# Patient Record
Sex: MALE | Race: WHITE | Employment: OTHER | URBAN - METROPOLITAN AREA
[De-identification: names, ages, dates, MRNs, and addresses within clinical notes are randomized per-mention and may not be internally consistent; named-entity substitution may affect disease eponyms.]

---

## 2017-01-17 ENCOUNTER — APPOINTMENT (OUTPATIENT)
Dept: GENERAL RADIOLOGY | Facility: HOSPITAL | Age: 67
DRG: 190 | End: 2017-01-17
Payer: MEDICARE

## 2017-01-17 ENCOUNTER — HOSPITAL ENCOUNTER (INPATIENT)
Facility: HOSPITAL | Age: 67
LOS: 9 days | Discharge: HOME | DRG: 190 | End: 2017-01-26
Attending: EMERGENCY MEDICINE | Admitting: HOSPITALIST
Payer: MEDICARE

## 2017-01-17 ENCOUNTER — APPOINTMENT (OUTPATIENT)
Dept: GENERAL RADIOLOGY | Facility: HOSPITAL | Age: 67
DRG: 190 | End: 2017-01-17
Attending: EMERGENCY MEDICINE
Payer: MEDICARE

## 2017-01-17 ENCOUNTER — PRIOR ORIGINAL RECORDS (OUTPATIENT)
Dept: OTHER | Age: 67
End: 2017-01-17

## 2017-01-17 DIAGNOSIS — J44.1 COPD EXACERBATION (HCC): ICD-10-CM

## 2017-01-17 DIAGNOSIS — I48.91 ATRIAL FIBRILLATION WITH RAPID VENTRICULAR RESPONSE (HCC): Primary | ICD-10-CM

## 2017-01-17 LAB
BASOPHILS # BLD AUTO: 0.07 X10(3) UL (ref 0–0.1)
BASOPHILS NFR BLD AUTO: 0.8 %
BETA NATRIURETIC PEPTIDE: 93 PG/ML (ref 2–99)
BUN BLD-MCNC: 21 MG/DL (ref 8–20)
CALCIUM BLD-MCNC: 9.4 MG/DL (ref 8.3–10.3)
CHLORIDE: 103 MMOL/L (ref 101–111)
CO2: 27 MMOL/L (ref 22–32)
CREAT BLD-MCNC: 1.22 MG/DL (ref 0.7–1.3)
EOSINOPHIL # BLD AUTO: 0.5 X10(3) UL (ref 0–0.3)
EOSINOPHIL NFR BLD AUTO: 5.6 %
ERYTHROCYTE [DISTWIDTH] IN BLOOD BY AUTOMATED COUNT: 12.5 % (ref 11.5–16)
FIO2: 21 %
GLUCOSE BLD-MCNC: 112 MG/DL (ref 70–99)
HAV IGM SER QL: 2.1 MG/DL (ref 1.7–3)
HCT VFR BLD AUTO: 49.8 % (ref 37–53)
HGB BLD-MCNC: 16.5 G/DL (ref 13–17)
IMMATURE GRANULOCYTE COUNT: 0.04 X10(3) UL (ref 0–1)
IMMATURE GRANULOCYTE RATIO %: 0.4 %
LYMPHOCYTES # BLD AUTO: 1.58 X10(3) UL (ref 0.9–4)
LYMPHOCYTES NFR BLD AUTO: 17.7 %
MCH RBC QN AUTO: 33.5 PG (ref 27–33.2)
MCHC RBC AUTO-ENTMCNC: 33.1 G/DL (ref 31–37)
MCV RBC AUTO: 101.2 FL (ref 80–99)
MONOCYTES # BLD AUTO: 0.54 X10(3) UL (ref 0.1–0.6)
MONOCYTES NFR BLD AUTO: 6 %
NEUTROPHIL ABS PRELIM: 6.2 X10 (3) UL (ref 1.3–6.7)
NEUTROPHILS # BLD AUTO: 6.2 X10(3) UL (ref 1.3–6.7)
NEUTROPHILS NFR BLD AUTO: 69.5 %
PLATELET # BLD AUTO: 159 10(3)UL (ref 150–450)
POTASSIUM SERPL-SCNC: 4.7 MMOL/L (ref 3.6–5.1)
RBC # BLD AUTO: 4.92 X10(6)UL (ref 3.8–5.8)
RED CELL DISTRIBUTION WIDTH-SD: 46.9 FL (ref 35.1–46.3)
SODIUM SERPL-SCNC: 139 MMOL/L (ref 136–144)
TROPONIN: <0.046 NG/ML (ref ?–0.05)
VENOUS BASE EXCESS: -0.3
VENOUS BLOOD GAS HCO3: 27.1 MEQ/L (ref 23–27)
VENOUS O2 SAT CALC: 38 % (ref 72–78)
VENOUS O2 SATURATION: 45 % (ref 72–78)
VENOUS PCO2: 53 MM HG (ref 38–50)
VENOUS PH: 7.32 (ref 7.33–7.43)
VENOUS PO2: 24 MM HG (ref 30–50)
WBC # BLD AUTO: 8.9 X10(3) UL (ref 4–13)

## 2017-01-17 PROCEDURE — 71010 XR CHEST AP PORTABLE  (CPT=71010): CPT

## 2017-01-17 PROCEDURE — 99223 1ST HOSP IP/OBS HIGH 75: CPT | Performed by: HOSPITALIST

## 2017-01-17 RX ORDER — ONDANSETRON 2 MG/ML
4 INJECTION INTRAMUSCULAR; INTRAVENOUS EVERY 6 HOURS PRN
Status: DISCONTINUED | OUTPATIENT
Start: 2017-01-17 | End: 2017-01-26

## 2017-01-17 RX ORDER — IPRATROPIUM BROMIDE AND ALBUTEROL SULFATE 2.5; .5 MG/3ML; MG/3ML
3 SOLUTION RESPIRATORY (INHALATION)
Status: DISCONTINUED | OUTPATIENT
Start: 2017-01-18 | End: 2017-01-20

## 2017-01-17 RX ORDER — IPRATROPIUM BROMIDE AND ALBUTEROL SULFATE 2.5; .5 MG/3ML; MG/3ML
3 SOLUTION RESPIRATORY (INHALATION) ONCE
Status: COMPLETED | OUTPATIENT
Start: 2017-01-17 | End: 2017-01-17

## 2017-01-17 RX ORDER — ACETAMINOPHEN 325 MG/1
650 TABLET ORAL EVERY 6 HOURS PRN
Status: DISCONTINUED | OUTPATIENT
Start: 2017-01-17 | End: 2017-01-26

## 2017-01-17 RX ORDER — METHYLPREDNISOLONE SODIUM SUCCINATE 125 MG/2ML
60 INJECTION, POWDER, LYOPHILIZED, FOR SOLUTION INTRAMUSCULAR; INTRAVENOUS EVERY 8 HOURS
Status: DISCONTINUED | OUTPATIENT
Start: 2017-01-18 | End: 2017-01-19

## 2017-01-17 RX ORDER — ALBUTEROL SULFATE 90 UG/1
2 AEROSOL, METERED RESPIRATORY (INHALATION) EVERY 6 HOURS PRN
COMMUNITY
End: 2017-03-07

## 2017-01-17 RX ORDER — METHYLPREDNISOLONE SODIUM SUCCINATE 125 MG/2ML
125 INJECTION, POWDER, LYOPHILIZED, FOR SOLUTION INTRAMUSCULAR; INTRAVENOUS ONCE
Status: COMPLETED | OUTPATIENT
Start: 2017-01-17 | End: 2017-01-17

## 2017-01-17 RX ORDER — DILTIAZEM HYDROCHLORIDE 5 MG/ML
10 INJECTION INTRAVENOUS
Status: DISPENSED | OUTPATIENT
Start: 2017-01-17 | End: 2017-01-17

## 2017-01-17 NOTE — ED INITIAL ASSESSMENT (HPI)
Pt states productive cough since tamiko. Pt states cough has improved but SOB is getting worse. Pt SOB with activity and with speaking.   Pt currently on 2nd round of abx (pt denies knowledge of name.) pt with pursed lip breathing

## 2017-01-18 ENCOUNTER — APPOINTMENT (OUTPATIENT)
Dept: CV DIAGNOSTICS | Facility: HOSPITAL | Age: 67
DRG: 190 | End: 2017-01-18
Attending: INTERNAL MEDICINE
Payer: MEDICARE

## 2017-01-18 LAB
ATRIAL RATE: 117 BPM
Q-T INTERVAL: 358 MS
QRS DURATION: 120 MS
QTC CALCULATION (BEZET): 499 MS
R AXIS: 103 DEGREES
T AXIS: -1 DEGREES
VENTRICULAR RATE: 117 BPM

## 2017-01-18 PROCEDURE — 99232 SBSQ HOSP IP/OBS MODERATE 35: CPT | Performed by: HOSPITALIST

## 2017-01-18 PROCEDURE — 93306 TTE W/DOPPLER COMPLETE: CPT | Performed by: INTERNAL MEDICINE

## 2017-01-18 PROCEDURE — 93306 TTE W/DOPPLER COMPLETE: CPT

## 2017-01-18 RX ORDER — DILTIAZEM HYDROCHLORIDE 180 MG/1
180 CAPSULE, EXTENDED RELEASE ORAL DAILY
Status: DISCONTINUED | OUTPATIENT
Start: 2017-01-18 | End: 2017-01-19

## 2017-01-18 NOTE — PLAN OF CARE
Patient is alert and oriented, denies any complaint of pain. Vitals signs stable, afib per tele. Switched to oral Cardizem. Lung diminished, nonproductive cough, scheduled nebs, robitussin added. Flu panel negative. Will continue to monitor.      Patient/Fa

## 2017-01-18 NOTE — PROGRESS NOTES
[facility]  Progress Note     Chrissy Cantu Patient Status:  Inpatient    3/18/1950 MRN [id number]   [facility] 7NE-A Attending Sylvia Lamas MD   Hosp Day # 1 PCP Gabriel De La Garza DO     Chief Complaint: sob and wheezing    S: Patie above    Quality:  · DVT Prophylaxis: Xarelto  · CODE status: full  · Ramsey: no  · Central line: no    Estimated date of discharge: tbd  Discharge is dependent on: clinical status  At this point Mr. Hunter Ayala is expected to be discharge to: home    Plan of ca

## 2017-01-18 NOTE — ED PROVIDER NOTES
Patient Seen in: [facility] 7ne-a    History   Patient presents with:  Dyspnea DHAVAL SOB (respiratory)    Stated Complaint: dhaval x one month    HPI    Patient is a 53-year-old male comes into emergency room for evaluation of cough and shortness of magan from today and agreed except as otherwise stated in HPI.     Physical Exam     ED Triage Vitals   BP 01/17/17 1635 162/100 mmHg   Pulse 01/17/17 1635 111   Resp 01/17/17 1635 24   Temp 01/17/17 1635 98 °F (36.7 °C)   Temp src 01/17/17 1635 Temporal   SpO2 0 46.9 (*)     Eosinophil Absolute 0.50 (*)     All other components within normal limits   TROPONIN I - Normal   BNP (B TYPE NATRIUERTIC PEPTIDE) - Normal   MAGNESIUM - Normal   INFLUENZA A+B, RT PCR W/RFLX TO INFLUENZA H1N1 - Normal   CBC WITH DIFFERENTIAL prolonged expiratory phase with slight wheezing. Patient given IV Solu-Medrol. Cardizem bolus given along with Cardizem drip. Case discussed with cardiology, Dr. Laz Wooten. Also discussed case with hospitalist, Dr. Franchesca Armendariz.     MDM   78year old male with c

## 2017-01-18 NOTE — PLAN OF CARE
Pt admitted from ER c/o SOB and cough, afib. Pt a/ox4. Vss, afib per tele, cardizem qtt infusing at 10ml/hr. O2 @ 2l/nc in use, nebs q4h per order. Denies pain. Droplet and contact isolation to r/o flu, pending respiratory panel. Call light in reach.  Will

## 2017-01-18 NOTE — H&P
[facility]  History and Physical     Kin Charlee Patient Status:  Inpatient    3/18/1950 MRN [id number]   [facility] 7NE-A Attending Belén Morgan MD   Hosp Day # 0 PCP Minh Chen DO     Chief Complaint: Dyspnea    Histor (81.647 kg)  BMI 25.83 kg/m2  SpO2 98%  General: No acute distress. Alert and oriented x 3. HEENT: Normocephalic atraumatic. Moist mucous membranes. EOM-I. PERRLA. Anicteric. Neck: No lymphadenopathy. No JVD. No carotid bruits.   Respiratory: B/L wheezing Patient will require inpatient services that will reasonably be expected to span two midnight's based on the clinical documentation in H+P.      Based on patients current state of illness, I anticipate that, after discharge, patient will require TBD

## 2017-01-18 NOTE — CONSULTS
[facility]  Report of Consultation    Preston Tripp Patient Status:  Inpatient    3/18/1950 MRN [id number]   [facility] 7NE-A Attending Noa Savage MD   Hosp Day # 1 PCP Loyd Claire DO     Reason Nebulization, 6 times per day  •  MethylPREDNISolone Sodium Succ (Solu-MEDROL) injection 60 mg, 60 mg, Intravenous, Q8H  •  ondansetron HCl (ZOFRAN) injection 4 mg, 4 mg, Intravenous, Q6H PRN    Review of Systems:  All systems were reviewed and are negativ

## 2017-01-18 NOTE — CONSULTS
[address] Patient Status:  Inpatient    3/18/1950 MRN [id number]   [facility] 7NE-A Attending Melanie Gaytan MD   [facility] Day # 1 PCP Laurie Iverson DO     Date of Admission: 2017  Admission Diagnosis: COPD exacerba (CARDIZEM) IVPB add-vantage, 5 mg/hr, Intravenous, Continuous  •  albuterol Sulfate (VENTOLIN) (5 MG/ML) 0.5% nebulizer solution 10 mg, 10 mg, Nebulization, Continuous  •  Rivaroxaban (XARELTO) tab 20 mg, 20 mg, Oral, Daily with food  •  acetaminophen (TYL regular rate and rhythm  Abdomen: soft, non-tender; bowel sounds normal; no masses,  no organomegaly  Extremities: extremities normal, atraumatic, no cyanosis or edema       Lab Results  Component Value Date   WBC 8.9 01/17/2017   RBC 4.92 01/17/2017   HGB

## 2017-01-18 NOTE — PROGRESS NOTES
Patient remains in atrial fibrillation but HRs are controlled. No history of afib in the past.  He is on xarelto 20mg daily due to of PE. Plan:  1. Wean off cardizem gtt  2. Defer DCCV for now given still battling COPD exacerbation  3.   Continue xare

## 2017-01-19 ENCOUNTER — PRIOR ORIGINAL RECORDS (OUTPATIENT)
Dept: OTHER | Age: 67
End: 2017-01-19

## 2017-01-19 LAB
FREE T4: 1 NG/DL (ref 0.9–1.8)
TSI SER-ACNC: 0.34 MIU/ML (ref 0.35–5.5)

## 2017-01-19 PROCEDURE — 99232 SBSQ HOSP IP/OBS MODERATE 35: CPT | Performed by: HOSPITALIST

## 2017-01-19 RX ORDER — DILTIAZEM HYDROCHLORIDE 60 MG/1
60 TABLET, FILM COATED ORAL ONCE
Status: COMPLETED | OUTPATIENT
Start: 2017-01-19 | End: 2017-01-19

## 2017-01-19 RX ORDER — PREDNISONE 20 MG/1
40 TABLET ORAL
Status: DISCONTINUED | OUTPATIENT
Start: 2017-01-20 | End: 2017-01-24

## 2017-01-19 RX ORDER — DILTIAZEM HYDROCHLORIDE 240 MG/1
240 CAPSULE, COATED, EXTENDED RELEASE ORAL DAILY
Status: DISCONTINUED | OUTPATIENT
Start: 2017-01-20 | End: 2017-01-26

## 2017-01-19 RX ORDER — CALCIUM CARBONATE 200(500)MG
500 TABLET,CHEWABLE ORAL 2 TIMES DAILY PRN
Status: DISCONTINUED | OUTPATIENT
Start: 2017-01-19 | End: 2017-01-26

## 2017-01-19 NOTE — PROGRESS NOTES
[facility]  Progress Note     Chrissy Cantu Patient Status:  Inpatient    3/18/1950 MRN [id number]   [facility] 7NE-A Attending Sylvia Lamas MD   Hosp Day # 2 PCP Gabriel De La Garza DO     Chief Complaint: sob and wheezing    S: Patie better on cardizem  3.  [facility]    Plan of care: continue ctu monitoring    Quality:  · DVT Prophylaxis: ac  · CODE status: full  · Ramsey: no  · Central line: no    Estimated date of discharge: tbd  Discharge is dependent on: clinical status  At this point

## 2017-01-19 NOTE — PLAN OF CARE
Pt alert/oriented, continues to have frequent dry coughing. A fib on monitor, CVR, Cardizem dose increased. Lung sounds with WE, steroids changed to oral with next dose. Plan: continue to monitor heart rate and rhythm and resp status.     1400: Discussed

## 2017-01-19 NOTE — PLAN OF CARE
AOx4. Denies any pain or discomfort. Pt is on O2 2L; O2 sat on upper 90's. Non-productive cough noted. Pt gets scheduled nebs. Up ad kaylynn. Plan of care discussed with pt. Call light within reach.     Patient/Family Goals    • Patient/Family Long Term Go

## 2017-01-19 NOTE — PROGRESS NOTES
Pulmonary Progress Note     Assessment / Plan:  1. COPD with acute exacerbation: no signs of infiltrate or pneumonia and agree with holding abx.  RVP is negative  -IV steroids to PO today  -cont albuterol nebs  -cont LABA / ICS  -will need o/p PFTs  -antitu

## 2017-01-19 NOTE — PROGRESS NOTES
[facility]  Progress Note    Will Cardoso Patient Status:  Inpatient    3/18/1950 MRN [id number]   [facility] 7NE-A Attending Kleber Moody MD   Hosp Day # 2 PCP Page DO Aileen     Assessment:  1.  afib with RVR - new onset.   Echo MethylPREDNISolone Sodium Succ  60 mg Intravenous Q8H     • albuterol Sulfate         Taylor Mcgraw MD  1/19/2017  9:38 AM

## 2017-01-19 NOTE — CM/SW NOTE
Pt on COPD protocol.        01/19/17 1100   CM/SW Screening   Referral Source Physician   Information Source Nursing rounds   Patient's Mental Status Alert;Oriented   Patient's [address]   Patient Status Prior to Admission   Independent with AD

## 2017-01-20 ENCOUNTER — APPOINTMENT (OUTPATIENT)
Dept: ULTRASOUND IMAGING | Facility: HOSPITAL | Age: 67
DRG: 190 | End: 2017-01-20
Attending: INTERNAL MEDICINE
Payer: MEDICARE

## 2017-01-20 ENCOUNTER — APPOINTMENT (OUTPATIENT)
Dept: GENERAL RADIOLOGY | Facility: HOSPITAL | Age: 67
DRG: 190 | End: 2017-01-20
Attending: INTERNAL MEDICINE
Payer: MEDICARE

## 2017-01-20 ENCOUNTER — APPOINTMENT (OUTPATIENT)
Dept: CT IMAGING | Facility: HOSPITAL | Age: 67
DRG: 190 | End: 2017-01-20
Attending: INTERNAL MEDICINE
Payer: MEDICARE

## 2017-01-20 LAB
ALLENS TEST: POSITIVE
ARTERIAL BLD GAS O2 SATURATION: 90 % (ref 92–100)
ARTERIAL BLOOD GAS BASE EXCESS: 2.1
ARTERIAL BLOOD GAS HCO3: 25.8 MEQ/L (ref 22–26)
ARTERIAL BLOOD GAS PCO2: 37 MM HG (ref 35–45)
ARTERIAL BLOOD GAS PH: 7.46 (ref 7.35–7.45)
ARTERIAL BLOOD GAS PO2: 55 MM HG (ref 80–105)
CALCULATED O2 SATURATION: 90 % (ref 92–100)
CARBOXYHEMOGLOBIN: 1.2 % SAT (ref 0–3)
EXPIRATORY PRESSURE: 6 CM H2O
FIO2: 21 %
INSP PRESSURE: 12 CM H2O
IONIZED CALCIUM: 1.25 MMOL/L (ref 1.12–1.32)
LACTIC ACID ARTERIAL: 1.3 MMOL/L (ref 0.5–2)
METHEMOGLOBIN: 0.5 % SAT (ref 0.4–1.5)
PATIENT TEMPERATURE: 98.6 F
POTASSIUM BLOOD GAS: 4.4 MMOL/L (ref 3.6–5.1)
SODIUM BLOOD GAS: 139 MMOL/L (ref 136–144)
TOTAL HEMOGLOBIN: 15 G/DL (ref 12.6–17.4)
VENT RATE: 12 /MIN

## 2017-01-20 PROCEDURE — 99232 SBSQ HOSP IP/OBS MODERATE 35: CPT | Performed by: HOSPITALIST

## 2017-01-20 PROCEDURE — 5A09357 ASSISTANCE WITH RESPIRATORY VENTILATION, LESS THAN 24 CONSECUTIVE HOURS, CONTINUOUS POSITIVE AIRWAY PRESSURE: ICD-10-PCS | Performed by: HOSPITALIST

## 2017-01-20 PROCEDURE — 71010 XR CHEST AP PORTABLE  (CPT=71010): CPT

## 2017-01-20 PROCEDURE — 71275 CT ANGIOGRAPHY CHEST: CPT

## 2017-01-20 PROCEDURE — 93970 EXTREMITY STUDY: CPT

## 2017-01-20 RX ORDER — IPRATROPIUM BROMIDE AND ALBUTEROL SULFATE 2.5; .5 MG/3ML; MG/3ML
3 SOLUTION RESPIRATORY (INHALATION)
Status: DISCONTINUED | OUTPATIENT
Start: 2017-01-20 | End: 2017-01-21

## 2017-01-20 RX ORDER — LORAZEPAM 1 MG/1
1 TABLET ORAL EVERY 4 HOURS PRN
Status: DISCONTINUED | OUTPATIENT
Start: 2017-01-20 | End: 2017-01-26

## 2017-01-20 RX ORDER — LORAZEPAM 0.5 MG/1
0.5 TABLET ORAL EVERY 4 HOURS PRN
Status: DISCONTINUED | OUTPATIENT
Start: 2017-01-20 | End: 2017-01-26

## 2017-01-20 NOTE — PLAN OF CARE
Pt c/o severe dyspnea this am, \"I can't get my breath, I should be better by now,\" sitting at side of bed using accessory muscles to breath. Sao2 87-90% on 2L, increased to 4L-Sao2 increased to 93-94%  Lung sound dim.   CXR ordered per Dr. Kimberly Tse at bedside

## 2017-01-20 NOTE — PROGRESS NOTES
[facility]  Progress Note     Edgard Rees Patient Status:  Inpatient    3/18/1950 MRN [id number]   [facility] 7NE-A Attending Adry Reyes MD   Hosp Day # 3 PCP Andrei Chatman DO     Chief Complaint: sob and wheezing    S: Patie ac  · CODE status: full  · Ramsey: no  · Central line: no    Estimated date of discharge: am  Discharge is dependent on: clinical status  At this point Mr. Medina Nick is expected to be discharge to: home    Plan of care discussed with patient    Cleopatra Dominguez MD

## 2017-01-20 NOTE — PROGRESS NOTES
[facility]  Progress Note    Kailey Gan Patient Status:  Inpatient    3/18/1950 MRN [id number]   [facility] 7NE-A Attending Yina Velasco MD   [facility] Day # 3 PCP Cristina Hernández DO     Assessment:  1. COPD exacerbation  2.   Afib with day     • albuterol Sulfate         Kendell Pandya MD  1/20/2017  8:59 AM

## 2017-01-20 NOTE — PLAN OF CARE
AOx4. Denies any pain. Pt breathes labored and c/o dryness to nares. Bubbler added to O2 per respiratory. Pt c/o indigestion. Spoke with Dr. Evan Yeboah and Tums ordered. Tums given pt stated he feels better. Non-productive cough noted.  Pt gets Nebs

## 2017-01-20 NOTE — PROGRESS NOTES
[address] Patient Status:  Inpatient    3/18/1950 MRN [id number]   [facility] 7NE-A Attending Tamir Delaney MD   Hosp Day # 3 PCP Prasad Cee DO     SUBJECTIVE:Acutely short of breath this morning.   Sitting at tatum symmetrical, trachea midline and thyroid not enlarged, symmetric, no tenderness/mass/nodules  Lungs: diminished breath sounds bilaterally  Heart: S1, S2 normal, no murmur, click, rub or gallop, regular rate and rhythm  Abdomen: soft, non-tender; bowel soun

## 2017-01-21 PROCEDURE — 99232 SBSQ HOSP IP/OBS MODERATE 35: CPT | Performed by: HOSPITALIST

## 2017-01-21 RX ORDER — IPRATROPIUM BROMIDE AND ALBUTEROL SULFATE 2.5; .5 MG/3ML; MG/3ML
3 SOLUTION RESPIRATORY (INHALATION) EVERY 4 HOURS
Status: DISCONTINUED | OUTPATIENT
Start: 2017-01-21 | End: 2017-01-22

## 2017-01-21 RX ORDER — CODEINE PHOSPHATE AND GUAIFENESIN 10; 100 MG/5ML; MG/5ML
5 SOLUTION ORAL EVERY 4 HOURS PRN
Status: DISCONTINUED | OUTPATIENT
Start: 2017-01-21 | End: 2017-01-26

## 2017-01-21 NOTE — PROGRESS NOTES
[facility]  Progress Note     Prashanthmaria victoria Rees Patient Status:  Inpatient    3/18/1950 MRN [id number]   [facility] 7NE-A Attending Adry Reyes MD   [address] Day # 4 PCP Andrei Chatman DO     Chief Complaint: sob    S: Patient feels bett better    Plan of care: continue ctu monitoring    Quality:  · DVT Prophylaxis: ac  · CODE status: full  · Ramsey: no  · Central line: no    Estimated date of discharge: tbd  Discharge is dependent on: clinical status  At this point Mr. Watkins Mauricio is expected t

## 2017-01-21 NOTE — PROGRESS NOTES
[address] Patient Status:  Inpatient    3/18/1950 MRN [id number]   [facility] 7NE-A Attending Jose Carmichael MD   [facility] Day # 4 PCP Easton Jeffrey DO     SUBJECTIVE:  Feeling improved.  Tried bipap yesterday post incide Head: Normocephalic, without obvious abnormality, atraumatic  Neck: no adenopathy, no carotid bruit, no JVD, supple, symmetrical, trachea midline and thyroid not enlarged, symmetric, no tenderness/mass/nodules  Lungs: diminished breath sounds bilaterally

## 2017-01-21 NOTE — PROGRESS NOTES
[facility] Progress Note  Subjective:  Feels better today - no dypsnea    Objective:  104/63  Afebrile  AFib  60s   I/O equal                                      Neuro:awake/alert  HEENT:noJVD, moist mucosa  Cardiac:S1 S2 regular  Lungs: matthias

## 2017-01-21 NOTE — PLAN OF CARE
Assumed care around 0730. Pt alert and oriented x4. VSS. Afib per tele. Rate controlled. On 2L of oxygen. . Per pt, states breathing feels better this morning. Still with nonproductive cough. Receiving scheduled cough medicine and scheduled nebulizers.

## 2017-01-21 NOTE — PLAN OF CARE
Pt was sitting at bedside breathing heavily. On O2 3L; O2 sat 96%. Pt stated \"I am not relaxing, I feel like I'm agitated. \"  Spoke with Dr. Jaqueline Tavera and Ativan ordered. RN notified pt Ativan is ordered. Pt does not need it at this time.  Pt stated he will

## 2017-01-22 PROCEDURE — 99232 SBSQ HOSP IP/OBS MODERATE 35: CPT | Performed by: HOSPITALIST

## 2017-01-22 RX ORDER — METHYLPREDNISOLONE SODIUM SUCCINATE 125 MG/2ML
80 INJECTION, POWDER, LYOPHILIZED, FOR SOLUTION INTRAMUSCULAR; INTRAVENOUS EVERY 8 HOURS
Status: COMPLETED | OUTPATIENT
Start: 2017-01-22 | End: 2017-01-22

## 2017-01-22 RX ORDER — IPRATROPIUM BROMIDE AND ALBUTEROL SULFATE 2.5; .5 MG/3ML; MG/3ML
3 SOLUTION RESPIRATORY (INHALATION) EVERY 4 HOURS PRN
Status: DISCONTINUED | OUTPATIENT
Start: 2017-01-22 | End: 2017-01-26

## 2017-01-22 RX ORDER — DILTIAZEM HYDROCHLORIDE 240 MG/1
240 CAPSULE, COATED, EXTENDED RELEASE ORAL DAILY
Qty: 30 CAPSULE | Refills: 11 | Status: SHIPPED | OUTPATIENT
Start: 2017-01-22 | End: 2017-03-10

## 2017-01-22 NOTE — PROGRESS NOTES
[facility]  Progress Note    Artist Leydi Patient Status:  Inpatient    3/18/1950 MRN [id number]   [facility] 7NE-A Attending Joanna Walker MD   [facility] Day # 5 PCP Silvana Anna DO       Assessment:  · COPD exacerbation  · Hx of PE  ·

## 2017-01-22 NOTE — PLAN OF CARE
Assumed care around 0730. Pt alert and oriented x4. Occasionally anxious. Ativan prn. Afib per tele. Rate controlled. On 2L of oxygen. . Lungs diminished and expiratory wheezes. Nonproductive cough. Receiving scheduled nebs and cough syrup.  Switched to

## 2017-01-22 NOTE — PROGRESS NOTES
[facility]  Progress Note     Jasonist Leydi Patient Status:  Inpatient    3/18/1950 MRN [id number]   [facility] 7NE-A Attending Joanna Walker MD   [facility] Day # 5 PCP Silvana Anna DO     Chief Complaint: sob    S: Patient feels bett exacerbation continue treatment and NIPPV  3. afib rvr better on cardizem  4.  [facility]to    Plan of care: continue ctu monitoring    Quality:  · DVT Prophylaxis: ac  · CODE status: full  · Ramsey: no  · Central line: no    Estimated date of disch

## 2017-01-22 NOTE — PROGRESS NOTES
Pulmonary Progress Note        NAME: Kailey Gan - ROOM: 3972/6545-Y - MRN: [id number] - Age: 78year old - : 3/18/1950        SUBJECTIVE: Feels that the nebs are making things worse, pt is tripoding this AM, states that he just got a breathing treat TPROT    Invalid input(s): ARTERIALPH, ARTERIALPO2, ARTERIALPCO2, ARTERIALHCO3    No results for input(s): BNP in the last 72 hours.     Invalid input(s): TROPI      TSH   Date/Time Value Ref Range Status   01/19/2017 05:58 AM 0.341* 0.350-5.500 mIU/mL Esha

## 2017-01-23 PROCEDURE — 99232 SBSQ HOSP IP/OBS MODERATE 35: CPT | Performed by: HOSPITALIST

## 2017-01-23 NOTE — PROGRESS NOTES
Patient has not ate any breakfast or lunch today stating \"I'm just not that hungry today\" Per patient his daughter will be bringing him dinner tonight.

## 2017-01-23 NOTE — PLAN OF CARE
AOx4, mildly anxious at times, 2-3L NC, afib rate controlled  Denies pain, VSS, dyspnea with exertion and at rest   Diminished lung sounds with rhonci- nonproductive cough  Tolerating diet.  Decreased appetite  PO steroids started today  Up ad kaylynn in room

## 2017-01-23 NOTE — PROGRESS NOTES
[facility]  Progress Note     Aleshasegun Richardson Patient Status:  Inpatient    3/18/1950 MRN [id number]   [facility] 7NE-A Attending Lauren Mcfadden MD   [facility] Day # 6 PCP Amber Welch DO     Chief Complaint: sob    S: Patient feels bett better on cardizem  4.  [facility] with [facility]to    Plan of care: continue ctu monitoring    Quality:  · DVT Prophylaxis: ac  · CODE status: full  · Ramsey: no  · Central line: no    Estimated date of discharge: tbd  Discharge is dependent on: clinical status

## 2017-01-23 NOTE — PLAN OF CARE
Assumed care at 1900:  Pt alert and oriented. Vss. Afebrile. o2 sat >90% on 2L. NC  afib on tele. Controlled rate. Short of breath a rest and with exertion. Pt with productive cough. IV solumedol given as ordered.  Pt states, \"I think this is the onl

## 2017-01-23 NOTE — PROGRESS NOTES
Pulmonary Progress Note        NAME: Kevon Ny - ROOM: [address] [place] [address]- - MRN: [id number] - Age: 78year old - : 3/18/1950      Assessment/Plan:  1.  Acute respiratory failure - with worsening hypoxia and now use of accessory muscles - due to COPD exacerba WBC, PLT in the last 72 hours. No results for input(s): GLU, BUN, CREATSERUM, GFRAA, GFRNAA, CA, ALB, NA, K, CL, CO2, ALKPHO, AST, ALT, BILT, TP in the last 72 hours.   Recent Labs   Lab  01/20/17   1320   ABGPHT  7.46*   LZMSLE4R  37   JIOZN7G  55*   [place]

## 2017-01-23 NOTE — PROGRESS NOTES
[facility]   Cardiology Progress Note     Subjective:   Feels his breathing is a little better today, on 2L O2. Denies any chest pain.      Objective:   /97 mmHg  Pulse 90  Temp(Src) 97.5 °F (36.4 °C) (Oral)  Resp 18  Ht 5' 10\" (1.778 m)  Wt 186 normal.     Systolic function was normal. RV systolic pressure (S, est): 32mm Hg. 6. Tricuspid valve: Structurally normal valve. Normal thickness leaflets.     There was mild regurgitation.   7. Pulmonary arteries: Systolic pressure was mildly increased, e

## 2017-01-24 PROCEDURE — 99232 SBSQ HOSP IP/OBS MODERATE 35: CPT | Performed by: HOSPITALIST

## 2017-01-24 RX ORDER — IPRATROPIUM BROMIDE AND ALBUTEROL SULFATE 2.5; .5 MG/3ML; MG/3ML
3 SOLUTION RESPIRATORY (INHALATION)
Status: DISCONTINUED | OUTPATIENT
Start: 2017-01-24 | End: 2017-01-25

## 2017-01-24 RX ORDER — METHYLPREDNISOLONE SODIUM SUCCINATE 125 MG/2ML
80 INJECTION, POWDER, LYOPHILIZED, FOR SOLUTION INTRAMUSCULAR; INTRAVENOUS EVERY 8 HOURS
Status: DISCONTINUED | OUTPATIENT
Start: 2017-01-24 | End: 2017-01-26

## 2017-01-24 RX ORDER — GUAIFENESIN 600 MG
600 TABLET, EXTENDED RELEASE 12 HR ORAL 2 TIMES DAILY
Status: DISCONTINUED | OUTPATIENT
Start: 2017-01-24 | End: 2017-01-26

## 2017-01-24 NOTE — PROGRESS NOTES
[address]  Patient Status:  Inpatient    3/18/1950 MRN [id number]   [facility] 7NE-A Attending Chapis Armenta MD   [facility] Day # 7 PCP Yury Gonzales,      SUBJECTIVE:  Very SOB upon my arrival with productive cough atraumatic, no cyanosis or edema         No results found for: PT, INR        Assessment/Plan:  1.  Acute respiratory insufficiency- - due to COPD exacerbation  -slowly improving but not anywhere near his baseline  -BD nebs, pulm valve  -No PTX or PE on 1/2

## 2017-01-24 NOTE — PROGRESS NOTES
[facility]   Cardiology Progress Note     Subjective:   Breathing is better, some soreness from coughing, otherwise denies pain. Objective:   /67 mmHg  Pulse 81  Temp(Src) 98.2 °F (36.8 °C) (Oral)  Resp 20  Ht 5' 10\" (1.778 m)  Wt 186 lb 8. systolic pressure (S, est): 32mm Hg. 6. Tricuspid valve: Structurally normal valve. Normal thickness leaflets.     There was mild regurgitation. 7. Pulmonary arteries: Systolic pressure was mildly increased, estimated to     be 32mm Hg.   Impressions:  No

## 2017-01-24 NOTE — PLAN OF CARE
Assumed care at 1900:  Pt alert and oriented. Vss. Afebrile. o2 sat >90% on 2L/NC. Short of breath at rest and with exertion. afib on tele. Controlled rate. Pt with non-productive cough. Robitussin given as ordered. Pt up ad kaylynn.    Voiding wit

## 2017-01-24 NOTE — PLAN OF CARE
Pt with more wheezing and now productive cough. No discharge today. Remains on o2 2l per nc. Back on solumedrol. Will cont to monitor.    RESPIRATORY - ADULT    • Achieves optimal ventilation and oxygenation Not Progressing

## 2017-01-25 PROCEDURE — 99232 SBSQ HOSP IP/OBS MODERATE 35: CPT | Performed by: HOSPITALIST

## 2017-01-25 RX ORDER — IPRATROPIUM BROMIDE AND ALBUTEROL SULFATE 2.5; .5 MG/3ML; MG/3ML
3 SOLUTION RESPIRATORY (INHALATION)
Status: DISCONTINUED | OUTPATIENT
Start: 2017-01-25 | End: 2017-01-26

## 2017-01-25 NOTE — PROGRESS NOTES
[facility]  Progress Note     Sybil Miner Patient Status:  Inpatient    3/18/1950 MRN [id number]   [facility] 7NE-A Attending Chapis Armenta MD   [address] Day # 8 PCP Yury Gonzales DO     Chief Complaint: sob    S: Patient feels bett to acute COPD exacerbation-continue iv solumedrol and nebs  2. Hx PE; anticoagulated on xarelto  3. Paroxysmal a.fib; rates controlled now on diltiazem  4. Tobacco abuse  5.  Macrocytosis-check b12 and folate;TSH low; recheck in 4 weeks after recovery    Pl

## 2017-01-25 NOTE — PLAN OF CARE
Assumed care at 1900:  Pt alert and oriented. Anxious at times. Vss. Afebrile. o2 sat >90% on 2L/nc. Pt desats on room air. Pt with shortness of breath at rest and with exertion. afib on tele. Controlled rate. Pt up ad kaylynn.    Voiding without

## 2017-01-25 NOTE — PROGRESS NOTES
[address] Patient Status:  Inpatient    3/18/1950 MRN [id number]   [facility] 7NE-A Attending Gomez Manjarrez MD   Hosp Day # 8 PCP Amber Welch, DO     SUBJECTIVE:Starting to feel better - no longer very short of Pulse 58  Temp(Src) 98 °F (36.7 °C) (Oral)  Resp 20  Ht 5' 10\" (1.778 m)  Wt 186 lb 8.2 oz (84.6 kg)  BMI 26.76 kg/m2  SpO2 98%  General appearance: alert, appears stated age and cooperative  Head: Normocephalic, without obvious abnormality, atraumatic  N

## 2017-01-25 NOTE — PLAN OF CARE
AOx4, 2L NC, afib rate controlled  Denies pain, VSS, dyspnea with exertion, denies chest pain  Diminished lung sounds- nonproductive cough  Tolerating diet when he eats but does have a decreased appetite  Continue to steroids  Up ad kaylynn in room  Potential

## 2017-01-26 VITALS
HEIGHT: 70 IN | OXYGEN SATURATION: 99 % | DIASTOLIC BLOOD PRESSURE: 66 MMHG | RESPIRATION RATE: 17 BRPM | BODY MASS INDEX: 26.7 KG/M2 | WEIGHT: 186.5 LBS | TEMPERATURE: 98 F | SYSTOLIC BLOOD PRESSURE: 112 MMHG | HEART RATE: 68 BPM

## 2017-01-26 LAB
FOLATE (FOLIC ACID), SERUM: 6.8 NG/ML (ref 8.7–24)
HAV AB SERPL IA-ACNC: 684 PG/ML (ref 193–986)

## 2017-01-26 PROCEDURE — 99239 HOSP IP/OBS DSCHRG MGMT >30: CPT | Performed by: HOSPITALIST

## 2017-01-26 RX ORDER — PREDNISONE 10 MG/1
TABLET ORAL
Qty: 30 TABLET | Refills: 0 | Status: SHIPPED | OUTPATIENT
Start: 2017-01-26 | End: 2017-03-10

## 2017-01-26 RX ORDER — PREDNISONE 20 MG/1
40 TABLET ORAL
Status: DISCONTINUED | OUTPATIENT
Start: 2017-01-27 | End: 2017-01-26

## 2017-01-26 RX ORDER — CODEINE PHOSPHATE AND GUAIFENESIN 10; 100 MG/5ML; MG/5ML
5 SOLUTION ORAL EVERY 4 HOURS PRN
Qty: 1 BOTTLE | Refills: 0 | Status: SHIPPED | OUTPATIENT
Start: 2017-01-26 | End: 2017-03-10

## 2017-01-26 RX ORDER — FOLIC ACID 1 MG/1
1 TABLET ORAL DAILY
Qty: 30 TABLET | Refills: 2 | Status: SHIPPED | OUTPATIENT
Start: 2017-01-26

## 2017-01-26 NOTE — PROGRESS NOTES
Feeling well today; coughing less. Faint wheezes on lung exam but moving air better today. No pain,nausea. Minimal cough. Vitals stable. Will likely need home o2.   Hopefully can d/c later today    Dmitri Mcclure MD  [facility]  Internal Medicine H

## 2017-01-26 NOTE — PLAN OF CARE
AOx4. Denies pain. Remains on O2 2L nasal cannula. Midnight vitals not taken per pt request due to pt was sleeping. Non-productive cough noted; Robitussin w/ Codeine given. Plan of care discussed with pt. Call light within reach.     Patient/Family [facility]

## 2017-01-26 NOTE — PROGRESS NOTES
[address] Patient Status:  Inpatient    3/18/1950 MRN [id number]   [facility] 7NE-A Attending Sidney Sender, MD   [address] Day # 9 PCP Elmer Carreonite, DO     Pulm / Critical Care Progress Note     S: pt feeling better. CREATININE, LABGLOM, CALCIUM    CREATININE (mg/dL)   Date Value   01/17/2017 1.22   ----------]    No results for input(s): ALKPHOS, ALT, AST in the last 72 hours. Invalid input(s): BILITOT, BILIDIR, PROT, LABALBU        ASSESSMENT/PLAN:    1.  Acute res

## 2017-01-26 NOTE — PROGRESS NOTES
[facility]  Progress Note     Antonio Valero Patient Status:  Inpatient    3/18/1950 MRN [id number]   [facility] 7NE-A Attending Falguni Julien MD   [facility] Day # 9 PCP Laurie Iverson DO     Chief Complaint: sob and wheezing    S: Pa Prednisone  2.  Acute respiratory failure hypoxia and hypercarbia with accessory muscles use upon coming in with COPD exacerbation- much better, no wheezing, able to walk in the hallways, d/c to home with oxygen  3. afib rvr better on cardizem-hr controlled

## 2017-01-26 NOTE — CM/SW NOTE
Cassie Pizarro, 01/26/2017, 12:24 PM  Spoke to [facility]. She indicates pt. Will definitely need home O2. Pulmonary to write the order. Once written will send referrals.

## 2017-01-26 NOTE — CM/SW NOTE
Adalberto Linares, 01/26/2017, 4:00 PM  Sent o2 sat values to [facility]. Called to confirm they received the referral at [phone]. Spoke to Scottie, as she is the person managing.  They (patient and familty are to call when they leave the hospital and to ask f

## 2017-01-26 NOTE — PLAN OF CARE
SPO2 on room air at rest: 90%  SPO2 ambulation on room air:82%  SPO2 ambulation on 02: 96% on 2L per minute

## 2017-01-27 NOTE — DISCHARGE SUMMARY
[facility]  DISCHARGE SUMMARY     Yousif Warner Patient Status:  Inpatient    3/18/1950 MRN [id number]   [facility] 7NE-A Attending No att. providers found   [facility] Day # 9 PCP Noemi Mcdermott DO     Date of Admission: 2017  Sandeep and recommendations (brief descriptions):  • none    Lab/Test results pending at Discharge:   · none    Consultants:  • pulm    Discharge Medication List:     Discharge Medications      START taking these medications       Instructions Prescription details [address] [address]     Phone:  [phone]    - DiltiaZEM HCl ER Coated Beads 240 MG Cp24  - predniSONE 10 MG Tabs      Please  your prescriptions at the location directed by your doctor

## 2017-01-27 NOTE — CM/SW NOTE
01/27/17 0900   Discharge disposition   Discharged to: Home or Self   Name of [facility] services after discharge [facility]   [address] [facility]   Discharge transportatio

## 2017-01-27 NOTE — PLAN OF CARE
Received patient today alert and oriented x3. Took him for O2 walk and he qualifies for oxygen. Orders obtained and tank was delivered to his room. All following MD's cleared patient for discharge today.  Discharge instructions given to patient, including O

## 2017-03-10 PROBLEM — J44.1 COPD EXACERBATION (HCC): Status: RESOLVED | Noted: 2017-01-17 | Resolved: 2017-03-10

## 2017-03-10 PROBLEM — B35.4 TINEA CORPORIS: Status: ACTIVE | Noted: 2017-03-10

## 2017-03-10 PROBLEM — R42 VERTIGO: Status: ACTIVE | Noted: 2017-03-10

## 2017-03-10 PROBLEM — J44.9 CHRONIC OBSTRUCTIVE PULMONARY DISEASE, UNSPECIFIED COPD TYPE (HCC): Status: ACTIVE | Noted: 2017-03-10

## 2017-03-10 PROBLEM — I48.0 PAROXYSMAL A-FIB (HCC): Status: ACTIVE | Noted: 2017-03-10

## 2017-03-10 PROBLEM — R93.1 ABNORMAL ECHOCARDIOGRAM: Status: ACTIVE | Noted: 2017-03-10

## 2017-03-10 PROBLEM — I48.91 ATRIAL FIBRILLATION WITH RAPID VENTRICULAR RESPONSE (HCC): Status: RESOLVED | Noted: 2017-01-17 | Resolved: 2017-03-10

## 2017-03-24 PROBLEM — R26.89 IMBALANCE: Status: ACTIVE | Noted: 2017-03-24

## 2017-04-04 ENCOUNTER — PRIOR ORIGINAL RECORDS (OUTPATIENT)
Dept: OTHER | Age: 67
End: 2017-04-04

## 2017-04-07 LAB
BNP: 93 PMOL/L
BUN: 21 MG/DL
CALCIUM: 9.4 MG/DL
CHLORIDE: 103 MEQ/L
CREATININE, SERUM: 1.22 MG/DL
FREE T4: 1 MG/DL
GLUCOSE: 112 MG/DL
HEMATOCRIT: 49.8 %
HEMOGLOBIN: 16.5 G/DL
PLATELETS: 159 K/UL
POTASSIUM, SERUM: 4.7 MEQ/L
RED BLOOD COUNT: 4.92 X 10-6/U
SODIUM: 139 MEQ/L
THYROID STIMULATING HORMONE: 0.34 MLU/L
WHITE BLOOD COUNT: 8.9 X 10-3/U

## 2017-04-13 ENCOUNTER — PRIOR ORIGINAL RECORDS (OUTPATIENT)
Dept: OTHER | Age: 67
End: 2017-04-13

## 2017-04-14 RX ORDER — DILTIAZEM HYDROCHLORIDE 240 MG/1
120 CAPSULE, COATED, EXTENDED RELEASE ORAL DAILY
COMMUNITY

## 2017-04-14 NOTE — HISTORICAL OFFICE NOTE
Annabella Tomlin  : 1950  ACCOUNT:  [id number]  [phone]  PCP:    TODAY'S DATE: 2017  DICTATED BY:  SELINA Claros ]    CHIEF COMPLAINT: [Hospital discharge.]    HPI:    [On 2017, Margarito Paz, a 75year old male, presented with no ALLERGIES: No Known Allergies    MEDICATIONS: Selected prescriptions see below    VITAL SIGNS: [B/P - 140/70 , Weight -  169, Height -   70 , BMI - 24.2 ]    CONS: well developed, well nourished. WEIGHT: BMI parameters reviewed and discussed.  HEAD/FACE 04/04/17 ProAir HFA            108 (90   as needed                                04/04/17 Ventolin HFA          108 (90   as needed                                04/04/17 Xarelto               20MG      1 tablet daily        Norma Thomas

## 2017-04-18 ENCOUNTER — HOSPITAL ENCOUNTER (OUTPATIENT)
Dept: INTERVENTIONAL RADIOLOGY/VASCULAR | Facility: HOSPITAL | Age: 67
Discharge: HOME | End: 2017-04-18
Attending: INTERNAL MEDICINE | Admitting: INTERNAL MEDICINE
Payer: MEDICARE

## 2017-04-18 VITALS
OXYGEN SATURATION: 95 % | RESPIRATION RATE: 16 BRPM | DIASTOLIC BLOOD PRESSURE: 92 MMHG | HEIGHT: 69.5 IN | HEART RATE: 78 BPM | SYSTOLIC BLOOD PRESSURE: 153 MMHG | BODY MASS INDEX: 27.07 KG/M2 | WEIGHT: 187 LBS

## 2017-04-18 DIAGNOSIS — I48.91 A-FIB (HCC): ICD-10-CM

## 2017-04-18 PROCEDURE — 93005 ELECTROCARDIOGRAM TRACING: CPT

## 2017-04-18 PROCEDURE — 5A2204Z RESTORATION OF CARDIAC RHYTHM, SINGLE: ICD-10-PCS | Performed by: INTERNAL MEDICINE

## 2017-04-18 PROCEDURE — 93010 ELECTROCARDIOGRAM REPORT: CPT | Performed by: INTERNAL MEDICINE

## 2017-04-18 PROCEDURE — 92960 CARDIOVERSION ELECTRIC EXT: CPT

## 2017-04-18 RX ORDER — SODIUM CHLORIDE 9 MG/ML
INJECTION, SOLUTION INTRAVENOUS CONTINUOUS
Status: DISCONTINUED | OUTPATIENT
Start: 2017-04-18 | End: 2017-04-18

## 2017-04-18 RX ADMIN — SODIUM CHLORIDE: 9 INJECTION, SOLUTION INTRAVENOUS at 12:15:00

## 2017-04-18 RX ADMIN — Medication 50 MG: at 13:00:00

## 2017-04-18 RX ADMIN — SODIUM CHLORIDE: 9 INJECTION, SOLUTION INTRAVENOUS at 13:15:00

## 2017-04-18 NOTE — PROCEDURES
PROCEDURE(S) PERFORMED:    1. Cardioversion. 2.     Sedation     :  Fab Velasquez MD     ANESTHESIA:  IV sedation. INDICATION:  Persistent atrial fibrillation. COMPLICATIONS:  None.      METHODS:  The patient was brought to the outpatient

## 2017-04-18 NOTE — H&P
[facility]  H&P    Cici Sow Patient Status:  Outpatient in a Bed    3/18/1950 MRN [id number]   Location [address] [facility] Attending Earl Birch MD   Hosp Day # 0 PCP Tomasz Keita MD and oriented in no apparent distress. HEENT: No focal deficits. Neck: No JVD, carotids 2+ no bruits. Cardiac:irregular, S1, S2 normal, no murmur, rub or gallop. Lungs: Clear without wheezes, rales, rhonchi or dullness. Normal excursions and effort.   A

## 2017-04-20 ENCOUNTER — MYAURORA ACCOUNT LINK (OUTPATIENT)
Dept: OTHER | Age: 67
End: 2017-04-20

## 2017-04-20 ENCOUNTER — PRIOR ORIGINAL RECORDS (OUTPATIENT)
Dept: OTHER | Age: 67
End: 2017-04-20

## 2017-04-27 ENCOUNTER — PRIOR ORIGINAL RECORDS (OUTPATIENT)
Dept: OTHER | Age: 67
End: 2017-04-27

## 2017-05-23 ENCOUNTER — PRIOR ORIGINAL RECORDS (OUTPATIENT)
Dept: OTHER | Age: 67
End: 2017-05-23

## 2017-06-08 ENCOUNTER — MYAURORA ACCOUNT LINK (OUTPATIENT)
Dept: OTHER | Age: 67
End: 2017-06-08

## 2017-06-23 ENCOUNTER — PRIOR ORIGINAL RECORDS (OUTPATIENT)
Dept: OTHER | Age: 67
End: 2017-06-23

## 2017-08-22 ENCOUNTER — MYAURORA ACCOUNT LINK (OUTPATIENT)
Dept: OTHER | Age: 67
End: 2017-08-22

## 2017-08-22 ENCOUNTER — PRIOR ORIGINAL RECORDS (OUTPATIENT)
Dept: OTHER | Age: 67
End: 2017-08-22

## 2017-08-29 ENCOUNTER — PRIOR ORIGINAL RECORDS (OUTPATIENT)
Dept: OTHER | Age: 67
End: 2017-08-29

## 2017-10-30 ENCOUNTER — PRIOR ORIGINAL RECORDS (OUTPATIENT)
Dept: OTHER | Age: 67
End: 2017-10-30

## 2017-12-01 ENCOUNTER — PRIOR ORIGINAL RECORDS (OUTPATIENT)
Dept: OTHER | Age: 67
End: 2017-12-01

## 2019-02-01 ENCOUNTER — PRIOR ORIGINAL RECORDS (OUTPATIENT)
Dept: OTHER | Age: 69
End: 2019-02-01

## 2019-02-01 ENCOUNTER — MYAURORA ACCOUNT LINK (OUTPATIENT)
Dept: OTHER | Age: 69
End: 2019-02-01

## 2019-02-28 VITALS
BODY MASS INDEX: 28.88 KG/M2 | HEART RATE: 72 BPM | SYSTOLIC BLOOD PRESSURE: 150 MMHG | HEIGHT: 69 IN | DIASTOLIC BLOOD PRESSURE: 100 MMHG | WEIGHT: 195 LBS

## 2019-02-28 VITALS
SYSTOLIC BLOOD PRESSURE: 134 MMHG | BODY MASS INDEX: 29.18 KG/M2 | WEIGHT: 197 LBS | DIASTOLIC BLOOD PRESSURE: 84 MMHG | HEART RATE: 84 BPM | HEIGHT: 69 IN

## 2019-02-28 VITALS
HEIGHT: 69 IN | WEIGHT: 197 LBS | BODY MASS INDEX: 29.18 KG/M2 | HEART RATE: 85 BPM | DIASTOLIC BLOOD PRESSURE: 80 MMHG | SYSTOLIC BLOOD PRESSURE: 124 MMHG

## 2019-03-01 VITALS
HEART RATE: 88 BPM | DIASTOLIC BLOOD PRESSURE: 70 MMHG | SYSTOLIC BLOOD PRESSURE: 140 MMHG | BODY MASS INDEX: 24.2 KG/M2 | WEIGHT: 169 LBS | HEIGHT: 70 IN

## 2019-03-01 VITALS
BODY MASS INDEX: 28.35 KG/M2 | SYSTOLIC BLOOD PRESSURE: 152 MMHG | DIASTOLIC BLOOD PRESSURE: 80 MMHG | WEIGHT: 198 LBS | HEIGHT: 70 IN | HEART RATE: 80 BPM

## 2019-03-01 VITALS
HEIGHT: 70 IN | SYSTOLIC BLOOD PRESSURE: 124 MMHG | HEART RATE: 88 BPM | BODY MASS INDEX: 27.35 KG/M2 | WEIGHT: 191 LBS | DIASTOLIC BLOOD PRESSURE: 84 MMHG

## 2019-04-21 RX ORDER — DILTIAZEM HYDROCHLORIDE 120 MG/1
CAPSULE, COATED, EXTENDED RELEASE ORAL
COMMUNITY
Start: 2017-06-23 | End: 2019-05-14 | Stop reason: SDUPTHER

## 2019-04-21 RX ORDER — ALBUTEROL SULFATE 90 UG/1
AEROSOL, METERED RESPIRATORY (INHALATION)
COMMUNITY
Start: 2017-04-04

## 2019-04-21 RX ORDER — FUROSEMIDE 20 MG/1
TABLET ORAL
COMMUNITY
Start: 2017-04-27

## 2019-04-21 RX ORDER — FLUTICASONE FUROATE AND VILANTEROL 200; 25 UG/1; UG/1
POWDER RESPIRATORY (INHALATION)
COMMUNITY
Start: 2017-04-04

## 2019-05-15 RX ORDER — DILTIAZEM HYDROCHLORIDE 120 MG/1
CAPSULE, COATED, EXTENDED RELEASE ORAL
Qty: 30 CAPSULE | Refills: 9 | Status: SHIPPED | OUTPATIENT
Start: 2019-05-15

## (undated) NOTE — IP AVS SNAPSHOT
[facility] [place] [address] [place], [address] ~ [phone]                Discharge Summary   1/17/2017    Kevon Ny           Admission Information        Provider Department    1/17/2017 Tiffanie Dodd MD  Maricarmen Gonzáles predniSONE 10 MG Tabs   Last time this was given:  40 mg on 1/24/2017  9:38 AM   Commonly known as:  DELTASONE        4 pills daily x 3 days, then 3 pills daily x 3 days, then 2 pills daily x 3 days, then 1 pill daily x 3 days    Kimberlee Araujo Why:  see a Nurse Practitioner in Dr. Chente Carson office 2 weeks - Cardiologist    Contact information:    [address] [address] [phone]          Follow up with Nahomy Esteves DO On 2/2/2017. 4.7 (01/17/17)  103      Radiology Exams     None         Additional Information       We are concerned for your overall well being:    - If you are a smoker or have smoked in the last 12 months, we encourage you to explore options for quitting.     - If y prescribed to take and their potential SIDE EFFECTS. Your nurse will review your medications with you before you are discharged, and can provide you with additional printed information.  Not all patients will experience these side effects or respond to [facility] What to report to your healthcare provider: Head or mouth pain, coating on mouth/tongue, shortness of breath, sensation of heart racing, rash, or itching           Steroids     predniSONE 10 MG Oral Tab         Use:  To treat inflammation, to prevent or alex

## (undated) NOTE — IP AVS SNAPSHOT
[facility] [place] [address] [place], [address] ~ [phone]                Discharge Summary   4/18/2017    Quang Kaur           Admission Information        Provider Department    4/18/2017 Gilles Zamora MD  [facility]aban 20 MG Tabs   Commonly known as:  XARELTO        Take 20 mg by mouth daily with food.       [    ]    [    ]    [    ]    [    ]               Follow-up Information     Follow up with Leonora Garcia an appointment as soon as [facility] will allow you to access patient instructions from your recent visit,  view other health information, and more. To sign up or find more information, go to [email or web address] and click on the Sign Up Now link in the [employer] box.      Enter Most common side effects: Abnormal bleeding   What to report to your healthcare team: Bruising, blood in urine or stool, or nosebleeds             Blood Producing Medications     folic acid 1 MG Oral Tab       Use: Increase blood cell counts   Most common